# Patient Record
Sex: MALE | Race: OTHER
[De-identification: names, ages, dates, MRNs, and addresses within clinical notes are randomized per-mention and may not be internally consistent; named-entity substitution may affect disease eponyms.]

---

## 2018-09-26 ENCOUNTER — HOSPITAL ENCOUNTER (EMERGENCY)
Dept: HOSPITAL 74 - JER | Age: 58
LOS: 1 days | Discharge: HOME | End: 2018-09-27
Payer: COMMERCIAL

## 2018-09-26 VITALS — BODY MASS INDEX: 30.1 KG/M2

## 2018-09-26 DIAGNOSIS — F20.9: ICD-10-CM

## 2018-09-26 DIAGNOSIS — F17.210: ICD-10-CM

## 2018-09-26 DIAGNOSIS — E86.0: Primary | ICD-10-CM

## 2018-09-26 NOTE — PDOC
History of Present Illness





- General


Chief Complaint: Urinary Problem


Stated Complaint: UTI


Time Seen by Provider: 09/26/18 22:59





- History of Present Illness


Initial Comments: 





59yo M complaining of foul-smelling urine x 1-2 days. Patient had an 

asymptomatic UTI about 20 years ago. Denies history of kidney stones or 

prostate issues. No frequency, dysuria, or urgency. Patient reports that he is 

able to empty all the way when he urinates. Denies penile discharge or genital 

lesions. No fever, chills, chest pain, or shortness of breath. 








Past History





- Past Medical History


Allergies/Adverse Reactions: 


 Allergies











Allergy/AdvReac Type Severity Reaction Status Date / Time


 


No Known Allergies Allergy   Verified 09/26/18 23:15











Home Medications: 


Ambulatory Orders





Methadone (Detox) [Dolophine -] 30 mg PO DAILY 09/26/18 


Zolpidem Tartrate [Ambien] 5 mg PO HS 09/26/18 











- Suicide/Smoking/Psychosocial Hx


Smoking History: Current some day smoker


Have you smoked in the past 12 months: Yes


Information on smoking cessation initiated: No


Hx Alcohol Use: Yes


Drug/Substance Use Hx: No





**Review of Systems





- Review of Systems


Comments:: 


Constitutional: no fever, no chills


Cardiovascular: no chest pain, no palpitations


Respiratory: no cough, no shortness of breath


Gastrointestinal: no abdominal pain, no nausea, no vomiting


Genitourinary: no dysuria, no frequency


Musculoskeletal: no myalgia, no arthralgia


Skin: no rash, no itching


Neurologic: no headache, no dizziness











*Physical Exam





- Vital Signs


 Last Vital Signs











Temp Pulse Resp BP Pulse Ox


 


 99.4 F   81   18   110/84   98 


 


 09/26/18 22:30  09/26/18 22:30  09/26/18 22:30  09/26/18 22:30  09/26/18 22:30














- Physical Exam


Comments: 


General: Awake, alert, and fully oriented, in no acute distress


Head: no signs of trauma


Eyes: EOMI, sclera anicteric


ENT: Moist mucus membranes, 


Neck: Normal ROM, supple


Lungs: Lungs clear, Normal breath sounds


Cardio: Regular rhythm, S1 and S2 present


Abdomen: Soft, nontender, normal bowel sounds. No guarding, no rebound, no 

masses


Extremities: Normal range of motion, Distal pulses present.


SKIN: Warm, Dry, normal turgor, no rashes or lesions noted


Neurologic: Cranial nerves II through XII grossly intact. Normal speech











Medical Decision Making





- Medical Decision Making


59yo M complaining of foul-smelling urine. UA and Urine culture ordered. 


Normal abdominal and  exam. 


UA negative. 


Will discharge. 


09/27/18 03:31











*DC/Admit/Observation/Transfer


Diagnosis at time of Disposition: 


 Dehydration








- Discharge Dispostion


Disposition: HOME


Condition at time of disposition: Stable





- Referrals


Referrals: 


Stephania Rivera MD [Primary Care Provider] - 





- Patient Instructions


Printed Discharge Instructions:  DI for Dehydration -- Adult


Additional Instructions: 


Please drink plenty of water. Your urine culture is pending. Please return to 

the ED with any further concerns or complaints. Please follow up with your PMD. 





- Post Discharge Activity

## 2018-09-26 NOTE — PDOC
Attending Attestation





- HPI


HPI: 


09/27/18 00:03


The patient is a 58-year-old male with past medical history significant for 

Schizophrenia and UTI (20 years ago) presents to the emergency department 

from [facility] with Malodorous urine. The patient reports since 

yesterday hes been having foul smelling urine, denies dysuria, hematuria 

frequency or urgency to urinate. The patient denies penile discharge or 

bleeding but endorses pain. Denies a history of STD, Gonorrhea or chlamydia. 

Denies fever, chills, abdominal pain, diarrhea, or constipation. Denies any 

recent changes to medication. The patient states he is eating and drinking 

regularly. The patient reports his last bowel movement was earlier today, 

denies melena or hematochezia. 





Allergies: NKA


Medication: According to patients records, Aricept 10mg, Haldol 5mg, 

Methadone 5mg, ASA EC 81mg, Pepcid 20mg, Depakote ER 500mg, Haldol dec inj 

100mg inject 2 ml intramuscularly every month (given by MD)


Social history: The patient reports he is sexually active. Daily use of tobacco 

is reported. Alcohol use is reported. No recreational drug use is reported. 


Surgical history: None reported. 


PCP: Stephania Girard MD 








- Physicial Exam


PE: 


09/27/18 00:10


GENERAL: Awake, alert, and fully oriented, in no acute distress


HEAD: No signs of trauma


EYES: PERRLA, EOMI, sclera anicteric, conjunctiva clear


ENT: Auricles normal inspection, hearing grossly normal, nares patent, 

oropharynx clear without exudates. Moist mucosa


NECK: Normal ROM, supple, no lymphadenopathy, JVD, or masses


LUNGS: Breath sounds equal, clear to auscultation bilaterally.  No wheezes, and 

no crackles


HEART: Regular rate and rhythm, normal S1 and S2, no murmurs, rubs or gallops


ABDOMEN: Soft, nontender, normoactive bowel sounds.  No guarding, no rebound.  

No masses


GENITOURINARY: (+) External genitalia normal and circumcised, no discharge, no 

rash or lesions. No scrotal tenderness. No testicular tenderness. 


EXTREMITIES: Normal range of motion, no edema.  No clubbing or cyanosis. No 

cords, erythema, or tenderness


NEUROLOGICAL: Cranial nerves II through XII grossly intact.  Normal speech. 


SKIN: Warm, Dry, normal turgor, no rashes or lesions noted. 





- Medical Decision Making








09/27/18 00:10





Documentation prepared by Kristin Smith, acting as medical scribe for Patsy Lora DO.





<Kristin Smith - Last Filed: 09/27/18 00:03>





- Resident


Resident Name: Leticia Aguirre





- ED Attending Attestation


I have performed the following: I have examined & evaluated the patient, The 

case was reviewed & discussed with the resident, I agree w/resident's findings 

& plan, Exceptions are as noted





- Medical Decision Making





09/26/18 23:04








I, Dr. Patsy Lora DO, attest that this document has been prepared under 

my direction and personally reviewed by me in its entirety.   I further attest, 

that it accurately reflects all work, treatment, procedures and medical decision

-making performed by me.  


09/26/18 23:44


59yo male from [place] with foul smelling urine since yesterday


-denies hx of STI


-denies rashes or lesions to genitalia


-denies discharge


-no abd pain


-no back pain


-no flank pain


-no f/c


-no n/v/d


-will send ua/ucx


-pt wtih hx of schizophrenia


09/27/18 02:13


ua negative for UTI, +ketones and protein


pt is nontoxic in appearance


pt drinking large bottle water


discussed ua results


pt stable for d/c to [place]





<Patsy Lora - Last Filed: 09/27/18 02:15>





**Discharge Disposition





- Discharge Dispostion


Decision to Admit order: No





<Patsy Lora - Last Filed: 09/27/18 02:15>





- Diagnosis


 Dehydration








- Discharge Dispostion


Disposition: HOME


Condition at time of disposition: Stable





- Referrals


Referrals: 


Stephania Rivera MD [Primary Care Provider] - 





- Patient Instructions


Printed Discharge Instructions:  DI for Dehydration -- Adult


Additional Instructions: 


Please drink plenty of water. Your urine culture is pending. Please return to 

the ED with any further concerns or complaints. Please follow up with your PMD. 





- Post Discharge Activity

## 2018-09-27 VITALS — DIASTOLIC BLOOD PRESSURE: 78 MMHG | TEMPERATURE: 98.5 F | HEART RATE: 79 BPM | SYSTOLIC BLOOD PRESSURE: 114 MMHG

## 2018-09-27 LAB
APPEARANCE UR: CLEAR
BACTERIA #/AREA URNS HPF: (no result) /HPF
BILIRUB UR STRIP.AUTO-MCNC: NEGATIVE MG/DL
COLOR UR: (no result)
EPITH CASTS URNS QL MICRO: (no result) /HPF
HYALINE CASTS URNS QL MICRO: 3 /LPF
KETONES UR QL STRIP: (no result)
LEUKOCYTE ESTERASE UR QL STRIP.AUTO: NEGATIVE
MUCOUS THREADS URNS QL MICRO: (no result)
NITRITE UR QL STRIP: NEGATIVE
PH UR: 5 [PH] (ref 5–8)
PROT UR QL STRIP: (no result)
PROT UR QL STRIP: NEGATIVE
SP GR UR: 1.03 (ref 1–1.03)
UROBILINOGEN UR STRIP-MCNC: 2 MG/DL (ref 0.2–1)

## 2022-05-02 PROBLEM — Z00.00 ENCOUNTER FOR PREVENTIVE HEALTH EXAMINATION: Status: ACTIVE | Noted: 2022-05-02
